# Patient Record
Sex: FEMALE | Race: WHITE | NOT HISPANIC OR LATINO | Employment: FULL TIME | URBAN - METROPOLITAN AREA
[De-identification: names, ages, dates, MRNs, and addresses within clinical notes are randomized per-mention and may not be internally consistent; named-entity substitution may affect disease eponyms.]

---

## 2023-07-23 PROBLEM — Z86.39 HISTORY OF IRON DEFICIENCY: Status: ACTIVE | Noted: 2023-07-23

## 2023-07-23 PROBLEM — G47.00 INSOMNIA: Status: ACTIVE | Noted: 2023-07-23

## 2023-07-23 PROBLEM — R63.6 UNDERWEIGHT: Status: ACTIVE | Noted: 2023-07-23

## 2023-07-23 PROBLEM — N92.0 HEAVY MENSTRUAL PERIOD: Status: RESOLVED | Noted: 2023-07-23 | Resolved: 2023-07-23

## 2023-11-08 ENCOUNTER — OFFICE VISIT (OUTPATIENT)
Dept: URGENT CARE | Facility: CLINIC | Age: 48
End: 2023-11-08
Payer: COMMERCIAL

## 2023-11-08 VITALS
WEIGHT: 109.2 LBS | OXYGEN SATURATION: 98 % | HEART RATE: 86 BPM | TEMPERATURE: 97.9 F | BODY MASS INDEX: 17.63 KG/M2 | DIASTOLIC BLOOD PRESSURE: 88 MMHG | SYSTOLIC BLOOD PRESSURE: 135 MMHG

## 2023-11-08 DIAGNOSIS — H66.91 ACUTE OTITIS MEDIA, RIGHT: Primary | ICD-10-CM

## 2023-11-08 DIAGNOSIS — R53.83 OTHER FATIGUE: ICD-10-CM

## 2023-11-08 LAB — POC RAPID MONO: NEGATIVE

## 2023-11-08 PROCEDURE — 86308 HETEROPHILE ANTIBODY SCREEN: CPT

## 2023-11-08 PROCEDURE — 99203 OFFICE O/P NEW LOW 30 MIN: CPT

## 2023-11-08 RX ORDER — METHYLPREDNISOLONE 4 MG/1
TABLET ORAL
Qty: 21 TABLET | Refills: 0 | Status: SHIPPED | OUTPATIENT
Start: 2023-11-08

## 2023-11-08 RX ORDER — AMOXICILLIN AND CLAVULANATE POTASSIUM 875; 125 MG/1; MG/1
1 TABLET, FILM COATED ORAL 2 TIMES DAILY
Qty: 14 TABLET | Refills: 0 | Status: SHIPPED | OUTPATIENT
Start: 2023-11-08 | End: 2023-11-15

## 2023-11-08 ASSESSMENT — ENCOUNTER SYMPTOMS
HEADACHES: 1
ADENOPATHY: 1
CARDIOVASCULAR NEGATIVE: 1
DIZZINESS: 0
DIARRHEA: 0
RHINORRHEA: 0
NAUSEA: 0
NECK STIFFNESS: 0
LIGHT-HEADEDNESS: 0
ABDOMINAL PAIN: 0
SHORTNESS OF BREATH: 0
NECK PAIN: 1
VOMITING: 0
FATIGUE: 1
CHILLS: 0
TROUBLE SWALLOWING: 0
DIAPHORESIS: 0
MYALGIAS: 0
SORE THROAT: 0
WEAKNESS: 0
FACIAL SWELLING: 0
FEVER: 0
COUGH: 0

## 2023-11-08 NOTE — PROGRESS NOTES
Subjective     Yolanda Guadalupe is a 48 y.o. female who presents for Adenopathy.    Patient presents with swelling on the right side of her neck, right ear pain, and fatigue for the last 3 days.       History provided by:  Medical records and patient      /88   Pulse 86   Temp 36.6 °C (97.9 °F)   Wt 49.5 kg (109 lb 3.2 oz)   LMP  (LMP Unknown)   SpO2 98%   BMI 17.63 kg/m²    All vitals have been reviewed and are stable.    Review of Systems   Constitutional:  Positive for fatigue. Negative for chills, diaphoresis and fever.   HENT:  Positive for ear pain and postnasal drip. Negative for congestion, dental problem, ear discharge, facial swelling, rhinorrhea, sore throat, tinnitus and trouble swallowing.    Respiratory:  Negative for cough and shortness of breath.    Cardiovascular: Negative.  Negative for chest pain.   Gastrointestinal:  Negative for abdominal pain, diarrhea, nausea and vomiting.   Musculoskeletal:  Positive for neck pain. Negative for myalgias and neck stiffness.   Skin:  Negative for rash.   Allergic/Immunologic: Positive for environmental allergies.   Neurological:  Positive for headaches. Negative for dizziness, weakness and light-headedness.   Hematological:  Positive for adenopathy.       Objective   Physical Exam  Vitals and nursing note reviewed.   Constitutional:       General: She is awake. She is not in acute distress.     Appearance: Normal appearance. She is well-developed.   HENT:      Head: Normocephalic and atraumatic.      Right Ear: Ear canal and external ear normal. A middle ear effusion is present. Tympanic membrane is erythematous.      Left Ear: Tympanic membrane, ear canal and external ear normal.      Nose: Nose normal. No rhinorrhea.      Mouth/Throat:      Lips: Pink.      Mouth: Mucous membranes are moist.      Pharynx: Oropharyngeal exudate and posterior oropharyngeal erythema present.   Eyes:      Extraocular Movements: Extraocular movements intact.       Conjunctiva/sclera: Conjunctivae normal.      Pupils: Pupils are equal, round, and reactive to light.   Cardiovascular:      Rate and Rhythm: Normal rate and regular rhythm.   Pulmonary:      Effort: Pulmonary effort is normal. No respiratory distress.   Abdominal:      General: Abdomen is flat. There is no distension.   Musculoskeletal:         General: No swelling or deformity. Normal range of motion.      Cervical back: Normal range of motion.   Lymphadenopathy:      Head:      Right side of head: No submental, submandibular or tonsillar adenopathy.      Cervical: Cervical adenopathy present.      Right cervical: Superficial cervical adenopathy present.   Skin:     General: Skin is warm and dry.   Neurological:      General: No focal deficit present.      Mental Status: She is alert and oriented to person, place, and time. Mental status is at baseline.      Motor: Motor function is intact.      Coordination: Coordination is intact.   Psychiatric:         Mood and Affect: Mood and affect normal.         Speech: Speech normal.         Behavior: Behavior normal.         Thought Content: Thought content normal.         Judgment: Judgment normal.         Assessment/Plan   Problem List Items Addressed This Visit    None  Visit Diagnoses       Acute otitis media, right    -  Primary    Relevant Medications    amoxicillin-pot clavulanate (Augmentin) 875-125 mg tablet    methylPREDNISolone (Medrol Dospak) 4 mg tablets    Other fatigue        Relevant Medications    methylPREDNISolone (Medrol Dospak) 4 mg tablets    Other Relevant Orders    POCT Infectious mononucleosis antibody manually resulted (Completed)            Red flags for reporting to ER have been reviewed with the patient.    Current diagnosis, any medication changes, and all in-office lab or radiologic results have been reviewed with the patient at the time of the visit.   If symptoms do not improve or worsen, patient is to follow up with PCP or report to the  emergency room.   Patient is alert and oriented x3 and non-toxic appearing. Vital signs are stable.   Patient and/or guardian has sufficient decision-making capabilities at this time and reports understanding and agreement with the treatment plan made through shared decision-making.

## 2023-11-08 NOTE — LETTER
November 8, 2023     Patient: Yolanda Guadalupe   YOB: 1975   Date of Visit: 11/8/2023       To Whom It May Concern:    Yolanda Guadalupe was seen in my clinic on 11/8/2023 at 9:30 am. Please excuse Yolanda for her absence from work on this day to make the appointment. She may return to work 11/10/23 if her symptoms are improved    If you have any questions or concerns, please don't hesitate to call.         Sincerely,         DO SANDRA  SCHEDULE        CC: No Recipients

## 2023-11-08 NOTE — PATIENT INSTRUCTIONS
ACUTE OTITIS MEDIA     - AMOXICILLIN - CLAVULANATE ACID (Augmentin) 7 DAYS (antibiotic) has been prescribed for the treatment of infection behind the ear drum   - METHYLPREDNISOLONE (oral steroid) has been prescribed to decrease inflammation and pain around the ears and eustachian tubes to allow proper drainage of fluid      - Ibuprofen and/or Acetaminophen may be used every 4-6 hours for pain, inflammation, and fever reduction as needed   - Hydrogen Peroxide or OTC Debrox drops may be used regularly to loosen cerumen (ear wax) to promote self cleaning    - Rubbing Alcohol and/or Distilled White Vinegar (Acetic Acid) may be used in ears after swimming to dry out water and prevent infection of the ear canal     - It is common to feel that ears still feel clogged a few days after completing treatment as fluid may remain behind the ear drum after the infection is cleared.  If symptoms do not improve or get worse in 3-4 days follow-up with PCP as additional treatment may be required.

## 2025-03-13 ENCOUNTER — OFFICE VISIT (OUTPATIENT)
Dept: URGENT CARE | Facility: CLINIC | Age: 50
End: 2025-03-13
Payer: COMMERCIAL

## 2025-03-13 ENCOUNTER — TELEPHONE (OUTPATIENT)
Dept: URGENT CARE | Facility: CLINIC | Age: 50
End: 2025-03-13

## 2025-03-13 VITALS
DIASTOLIC BLOOD PRESSURE: 85 MMHG | TEMPERATURE: 99.5 F | WEIGHT: 105 LBS | BODY MASS INDEX: 16.95 KG/M2 | SYSTOLIC BLOOD PRESSURE: 118 MMHG | HEART RATE: 111 BPM | OXYGEN SATURATION: 90 %

## 2025-03-13 DIAGNOSIS — R05.1 ACUTE COUGH: ICD-10-CM

## 2025-03-13 DIAGNOSIS — R50.9 FEVER, UNSPECIFIED FEVER CAUSE: ICD-10-CM

## 2025-03-13 DIAGNOSIS — R09.02 HYPOXIA: ICD-10-CM

## 2025-03-13 DIAGNOSIS — J22 LOWER RESPIRATORY INFECTION: Primary | ICD-10-CM

## 2025-03-13 LAB
POC RAPID INFLUENZA A: NEGATIVE
POC RAPID INFLUENZA B: NEGATIVE
POC SARS-COV-2 AG BINAX: NORMAL

## 2025-03-13 PROCEDURE — 94640 AIRWAY INHALATION TREATMENT: CPT | Performed by: PHYSICIAN ASSISTANT

## 2025-03-13 PROCEDURE — 99215 OFFICE O/P EST HI 40 MIN: CPT | Performed by: PHYSICIAN ASSISTANT

## 2025-03-13 PROCEDURE — 87811 SARS-COV-2 COVID19 W/OPTIC: CPT | Performed by: PHYSICIAN ASSISTANT

## 2025-03-13 PROCEDURE — 87804 INFLUENZA ASSAY W/OPTIC: CPT | Mod: CLIA WAIVED TEST | Performed by: PHYSICIAN ASSISTANT

## 2025-03-13 RX ORDER — ALBUTEROL SULFATE 90 UG/1
2 INHALANT RESPIRATORY (INHALATION) EVERY 6 HOURS PRN
Qty: 18 G | Refills: 0 | Status: SHIPPED | OUTPATIENT
Start: 2025-03-13 | End: 2025-04-12

## 2025-03-13 RX ORDER — AZITHROMYCIN 250 MG/1
TABLET, FILM COATED ORAL
Qty: 6 TABLET | Refills: 0 | Status: SHIPPED | OUTPATIENT
Start: 2025-03-13

## 2025-03-13 RX ORDER — PREDNISONE 20 MG/1
TABLET ORAL
Qty: 12 TABLET | Refills: 0 | Status: SHIPPED | OUTPATIENT
Start: 2025-03-13 | End: 2025-03-19

## 2025-03-13 RX ORDER — BENZONATATE 200 MG/1
200 CAPSULE ORAL 3 TIMES DAILY PRN
Qty: 21 CAPSULE | Refills: 0 | Status: SHIPPED | OUTPATIENT
Start: 2025-03-13 | End: 2025-03-20

## 2025-03-13 RX ORDER — IPRATROPIUM BROMIDE AND ALBUTEROL SULFATE 2.5; .5 MG/3ML; MG/3ML
3 SOLUTION RESPIRATORY (INHALATION) ONCE
Status: COMPLETED | OUTPATIENT
Start: 2025-03-13 | End: 2025-03-13

## 2025-03-13 RX ADMIN — IPRATROPIUM BROMIDE AND ALBUTEROL SULFATE 3 ML: 2.5; .5 SOLUTION RESPIRATORY (INHALATION) at 10:21

## 2025-03-13 NOTE — LETTER
March 13, 2025     Patient: Yolanda Guadalupe   YOB: 1975   Date of Visit: 3/13/2025       To Whom It May Concern:    It is my medical opinion that Yolanda Guadalupe may return to work on 3/17/25 . She should be excused from work 3/10/25-3/16/25. She may return to work 3/17/25.     If you have any questions or concerns, please don't hesitate to call.         Sincerely,        Jaimie Painter PA-C    CC: No Recipients

## 2025-03-13 NOTE — PROGRESS NOTES
Subjective   Patient ID: Yolanda Guadalupe is a 49 y.o. female.    Patient presents with cough, wheezing, headache, muscle aches, chills, sinus congestion. Sx have been ongoing x 6 days. She does not have a Hx of COPD. She is an everyday smoker, but this last pack of cigarettes has lasted her 1 week because she has been so sick. States that she works around a lot of people who have been sick on and off and her daughter was recently sick. She has only been using Advil for her Sx with little relief. Denies: dizziness, CP, abdominal pain, N/V/D, rash, swelling, bruising, ear pain, sore throat. She has not been coughing up any mucous, cough is mostly dry. She denies significant Hx of lung issues - had PNA once when she was 9 years old. Denies any Hx of blood clots, family or personal. Denies any recent travel or surgeries.       History provided by:  Patient      Review of Systems   All other systems reviewed and are negative.    Objective     Physical Exam  Vitals reviewed.   Constitutional:       General: She is awake.      Appearance: Normal appearance. She is well-developed. She is ill-appearing. She is not toxic-appearing.   HENT:      Head: Normocephalic and atraumatic.      Right Ear: Tympanic membrane and ear canal normal.      Left Ear: Tympanic membrane and ear canal normal.      Nose: Nose normal.      Mouth/Throat:      Lips: Pink.      Mouth: Mucous membranes are moist.      Pharynx: Oropharynx is clear.   Cardiovascular:      Rate and Rhythm: Regular rhythm. Tachycardia present.   Pulmonary:      Effort: Pulmonary effort is normal. No tachypnea, accessory muscle usage, prolonged expiration, respiratory distress or retractions.      Breath sounds: Decreased air movement and transmitted upper airway sounds present. Decreased breath sounds, rhonchi and rales present.      Comments: Rhonchi/rales in all lung fields.  Musculoskeletal:      Cervical back: Full passive range of motion without pain.      Right  lower leg: No edema.      Left lower leg: No edema.   Skin:     General: Skin is warm and dry.      Findings: No lesion or rash.   Neurological:      General: No focal deficit present.      Mental Status: She is alert and oriented to person, place, and time.      Cranial Nerves: No facial asymmetry.      Motor: Motor function is intact.      Gait: Gait is intact.   Psychiatric:         Attention and Perception: Attention normal.         Mood and Affect: Mood and affect normal.         Assessment/Plan   Problem List Items Addressed This Visit    None  Visit Diagnoses         Codes    Lower respiratory infection    -  Primary J22    Relevant Medications    azithromycin (Zithromax) 250 mg tablet    predniSONE (Deltasone) 20 mg tablet    albuterol 90 mcg/actuation inhaler    benzonatate (Tessalon) 200 mg capsule    Hypoxia     R09.02    Relevant Medications    ipratropium-albuteroL (Duo-Neb) 0.5-2.5 mg/3 mL nebulizer solution 3 mL (Completed)    predniSONE (Deltasone) 20 mg tablet    albuterol 90 mcg/actuation inhaler    Other Relevant Orders    XR chest 2 views    POCT Influenza A/B manually resulted (Completed)    POCT BinaxNOW Covid-19 Ag Card manually resulted (Completed)    Fever, unspecified fever cause     R50.9    Relevant Orders    XR chest 2 views    POCT Influenza A/B manually resulted (Completed)    POCT BinaxNOW Covid-19 Ag Card manually resulted (Completed)    Acute cough     R05.1    Relevant Medications    ipratropium-albuteroL (Duo-Neb) 0.5-2.5 mg/3 mL nebulizer solution 3 mL (Completed)    benzonatate (Tessalon) 200 mg capsule    Other Relevant Orders    XR chest 2 views    POCT Influenza A/B manually resulted (Completed)    POCT BinaxNOW Covid-19 Ag Card manually resulted (Completed)          Pt's SPO2 upon arrival was 80%, went up to 86% with sitting. Pt did not appear to be in any acute respiratory distress. We let her sit a moment and administered a DuoNeb treatment, after which her SPO2 went to  90-91% and stayed at 90% consistently. Again, pt was not using accessory muscles to breathe, was able to speak in complete sentences and appeared in no acute distress. Pt did have a lot of callouses on her hands, it is possible that we were unable to get a good reading because of this; however, her lungs were extremely coarse and rales/rhonchi were present in nearly all lung fields.   Did discharge pt with understanding that she would report to [facility] in [place] for STAT XR right upon leaving so we could determine if she needed additional treatment.   Low concern for PE at this time. PNA suspected. She was treated with medication outpatient as above, though did warn pt that we may call and advise ER depending on XR results.   She was also advised if ANY Sx worsen to report to ER immediately or call 911 if she is feeling too SOB.   Work note provided     Red flag symptoms reviewed with patient and all questions answered. Patient or parent/guardian verbalized understanding and agreement with care plan as above. All in office testing reviewed with patient. If symptoms worsen or do not improve, patient is to follow up with PCP or report to the ER.    Patient disposition: Home

## 2025-03-13 NOTE — TELEPHONE ENCOUNTER
Please call pt and let her know that per radiologist, her CXR is normal -- does not show any pneumonia. Could just be a very severe case of bacterial bronchitis, which may not show up on XR. She should continue with the medications Rx to her. ER if any worsening Sx.

## 2025-07-02 ENCOUNTER — OFFICE VISIT (OUTPATIENT)
Facility: CLINIC | Age: 50
End: 2025-07-02
Payer: COMMERCIAL

## 2025-07-02 VITALS
SYSTOLIC BLOOD PRESSURE: 117 MMHG | BODY MASS INDEX: 16.46 KG/M2 | RESPIRATION RATE: 16 BRPM | HEART RATE: 110 BPM | WEIGHT: 102 LBS | TEMPERATURE: 98.4 F | OXYGEN SATURATION: 94 % | DIASTOLIC BLOOD PRESSURE: 80 MMHG

## 2025-07-02 DIAGNOSIS — J22 LOWER RESPIRATORY INFECTION: Primary | ICD-10-CM

## 2025-07-02 DIAGNOSIS — R06.2 WHEEZING ON AUSCULTATION: ICD-10-CM

## 2025-07-02 DIAGNOSIS — J02.9 PHARYNGITIS, UNSPECIFIED ETIOLOGY: ICD-10-CM

## 2025-07-02 LAB
POC RAPID STREP: NEGATIVE
POC SARS-COV-2 AG BINAX: NORMAL

## 2025-07-02 PROCEDURE — 87811 SARS-COV-2 COVID19 W/OPTIC: CPT | Performed by: PHYSICIAN ASSISTANT

## 2025-07-02 PROCEDURE — 87880 STREP A ASSAY W/OPTIC: CPT | Performed by: PHYSICIAN ASSISTANT

## 2025-07-02 PROCEDURE — 99213 OFFICE O/P EST LOW 20 MIN: CPT | Performed by: PHYSICIAN ASSISTANT

## 2025-07-02 RX ORDER — AZITHROMYCIN 250 MG/1
TABLET, FILM COATED ORAL
Qty: 6 TABLET | Refills: 0 | Status: SHIPPED | OUTPATIENT
Start: 2025-07-02

## 2025-07-02 RX ORDER — METHYLPREDNISOLONE 4 MG/1
TABLET ORAL
Qty: 21 TABLET | Refills: 0 | Status: SHIPPED | OUTPATIENT
Start: 2025-07-02

## 2025-07-02 RX ORDER — BROMPHENIRAMINE MALEATE, PSEUDOEPHEDRINE HYDROCHLORIDE, AND DEXTROMETHORPHAN HYDROBROMIDE 2; 30; 10 MG/5ML; MG/5ML; MG/5ML
10 SYRUP ORAL 4 TIMES DAILY PRN
Qty: 280 ML | Refills: 0 | Status: SHIPPED | OUTPATIENT
Start: 2025-07-02 | End: 2025-07-09

## 2025-07-02 RX ORDER — ALBUTEROL SULFATE 90 UG/1
2 INHALANT RESPIRATORY (INHALATION) EVERY 6 HOURS PRN
Qty: 18 G | Refills: 0 | Status: SHIPPED | OUTPATIENT
Start: 2025-07-02 | End: 2025-08-01

## 2025-07-02 NOTE — LETTER
July 2, 2025     Patient: Yolanda Guadalupe   YOB: 1975   Date of Visit: 7/2/2025       To Whom It May Concern:    Yolanda Guadalupe was seen in my clinic on 7/2/2025 at 9:10 am. Please excuse Yolanda for her absence from work from 6/20/25-7/3/25. She may return to work on 7/7/25 without restrictions.    If you have any questions or concerns, please don't hesitate to call.         Sincerely,         Jaimie Painter PA-C        CC: No Recipients

## 2025-07-02 NOTE — PROGRESS NOTES
Subjective   Patient ID: Yolanda Guadalupe is a 49 y.o. female who presents for Cough and Headache.    HPI     Pt c/p deep wet cough, sore throat, and headache x 3 days. Was Dx with lower respiratory infection through our office 3/13/25. CXR was performed, was normal. She was given Zpak, albuterol, tessalon and medrol with improvement. She has been taking Coricidin and Bhavana Schroeder this time around without relief. Denies any sick contacts. She has been having body aches, chills, sweats but no documented fever. Denies: dizziness, CP, abdominal pain, N/V/D, rash, swelling, bruising, ear pain. She does not have asthma or COPD that she is aware of, but does typically smoke cigarettes - smokes around 1 ppd.      Review of Systems   All other systems reviewed and are negative.      Objective   /80   Pulse 110   Temp 36.9 °C (98.4 °F)   Resp 16   Wt 46.3 kg (102 lb)   SpO2 94%   BMI 16.46 kg/m²       Physical Exam  Vitals reviewed.   Constitutional:       General: She is awake.      Appearance: Normal appearance. She is well-developed.   HENT:      Head: Normocephalic and atraumatic.      Right Ear: Tympanic membrane and ear canal normal.      Left Ear: Tympanic membrane and ear canal normal.      Nose: Nose normal.      Mouth/Throat:      Lips: Pink.      Mouth: Mucous membranes are moist.      Pharynx: Oropharynx is clear.   Cardiovascular:      Rate and Rhythm: Normal rate and regular rhythm.   Pulmonary:      Effort: Pulmonary effort is normal.      Breath sounds: Decreased air movement present. Decreased breath sounds and wheezing (diffuse end-expiratory) present. No rhonchi or rales.      Comments: Crackling/wet noise in all lung fields, persistent cough on exam   Musculoskeletal:      Cervical back: Full passive range of motion without pain.      Right lower leg: No edema.      Left lower leg: No edema.   Skin:     General: Skin is warm and dry.      Findings: No lesion or rash.   Neurological:       General: No focal deficit present.      Mental Status: She is alert and oriented to person, place, and time.      Cranial Nerves: No facial asymmetry.      Motor: Motor function is intact.      Gait: Gait is intact.   Psychiatric:         Attention and Perception: Attention normal.         Mood and Affect: Mood and affect normal.         Assessment/Plan   Problem List Items Addressed This Visit    None  Visit Diagnoses         Codes      Lower respiratory infection    -  Primary J22    Relevant Medications    albuterol 90 mcg/actuation inhaler    azithromycin (Zithromax) 250 mg tablet    brompheniramine-pseudoeph-DM 2-30-10 mg/5 mL syrup    methylPREDNISolone (Medrol Dospak) 4 mg tablets    Other Relevant Orders    XR chest 2 views      Pharyngitis, unspecified etiology     J02.9    Relevant Orders    POCT BinaxNOW Covid-19 Ag Card manually resulted    POCT rapid strep A manually resulted      Wheezing on auscultation     R06.2    Relevant Medications    albuterol 90 mcg/actuation inhaler    methylPREDNISolone (Medrol Dospak) 4 mg tablets    Other Relevant Orders    XR chest 2 views          Negative COVID and strep here in the office   CXR ordered due to crackles in the lungs, though suspect this is more congestion in the chest due to diffuse nature   Medications Rx as above   ER if worsening Sx     Red flag symptoms reviewed with patient and all questions answered to patient or guardian's satisfaction. Patient or guardian verbalized understanding and agreement with care plan as above. All in office testing reviewed with patient/guardian. If symptoms worsen or do not improve, patient is to follow up with PCP or report to the ER.